# Patient Record
Sex: FEMALE | Race: WHITE | NOT HISPANIC OR LATINO | Employment: UNEMPLOYED | URBAN - METROPOLITAN AREA
[De-identification: names, ages, dates, MRNs, and addresses within clinical notes are randomized per-mention and may not be internally consistent; named-entity substitution may affect disease eponyms.]

---

## 2017-07-31 ENCOUNTER — TRANSCRIBE ORDERS (OUTPATIENT)
Dept: WOMENS IMAGING | Facility: HOSPITAL | Age: 22
End: 2017-07-31

## 2017-07-31 DIAGNOSIS — O28.3 ABNORMAL FETAL ULTRASOUND: Primary | ICD-10-CM

## 2017-08-08 ENCOUNTER — OFFICE VISIT (OUTPATIENT)
Dept: OBSTETRICS AND GYNECOLOGY | Facility: HOSPITAL | Age: 22
End: 2017-08-08

## 2017-08-08 ENCOUNTER — HOSPITAL ENCOUNTER (OUTPATIENT)
Dept: WOMENS IMAGING | Facility: HOSPITAL | Age: 22
Discharge: HOME | End: 2017-08-08
Admitting: NURSE PRACTITIONER

## 2017-08-08 VITALS
HEIGHT: 63 IN | DIASTOLIC BLOOD PRESSURE: 63 MMHG | BODY MASS INDEX: 33.84 KG/M2 | SYSTOLIC BLOOD PRESSURE: 120 MMHG | WEIGHT: 191 LBS

## 2017-08-08 DIAGNOSIS — IMO0001 FETAL RENAL ANOMALY, FETUS 1: Primary | ICD-10-CM

## 2017-08-08 DIAGNOSIS — O35.DXX0 ECHOGENIC FOCUS OF BOWEL OF FETUS AFFECTING ANTEPARTUM CARE OF MOTHER: ICD-10-CM

## 2017-08-08 DIAGNOSIS — O28.3 ABNORMAL FETAL ULTRASOUND: ICD-10-CM

## 2017-08-08 PROBLEM — IMO0002 FETAL RENAL ANOMALY: Status: ACTIVE | Noted: 2017-08-08

## 2017-08-08 PROCEDURE — 76811 OB US DETAILED SNGL FETUS: CPT | Performed by: OBSTETRICS & GYNECOLOGY

## 2017-08-08 PROCEDURE — 99241 PR OFFICE CONSULTATION NEW/ESTAB PATIENT 15 MIN: CPT | Performed by: OBSTETRICS & GYNECOLOGY

## 2017-08-08 PROCEDURE — 76811 OB US DETAILED SNGL FETUS: CPT

## 2017-08-08 RX ORDER — PRENATAL WITH FERROUS FUM AND FOLIC ACID 3080; 920; 120; 400; 22; 1.84; 3; 20; 10; 1; 12; 200; 27; 25; 2 [IU]/1; [IU]/1; MG/1; [IU]/1; MG/1; MG/1; MG/1; MG/1; MG/1; MG/1; UG/1; MG/1; MG/1; MG/1; MG/1
TABLET ORAL DAILY
COMMUNITY

## 2017-08-08 RX ORDER — BUPRENORPHINE 2 MG/1
1 TABLET SUBLINGUAL DAILY
COMMUNITY

## 2017-08-08 NOTE — PROGRESS NOTES
Documentation of the ultrasound findings, images, and interpretations will be available in the patient's ViewPoint report located in the chart review imaging tab in [facility].

## 2017-08-08 NOTE — PROGRESS NOTES
Pt denies problems with pregnancy. Pt denies having had any genetic screening tests. Next OB appt in 4 weeks.

## 2017-08-14 ENCOUNTER — TELEPHONE (OUTPATIENT)
Dept: WOMENS IMAGING | Facility: HOSPITAL | Age: 22
End: 2017-08-14

## 2017-09-05 ENCOUNTER — APPOINTMENT (OUTPATIENT)
Dept: WOMENS IMAGING | Facility: HOSPITAL | Age: 22
End: 2017-09-05
Attending: OBSTETRICS & GYNECOLOGY

## 2017-09-13 ENCOUNTER — OFFICE VISIT (OUTPATIENT)
Dept: OBSTETRICS AND GYNECOLOGY | Facility: HOSPITAL | Age: 22
End: 2017-09-13

## 2017-09-13 ENCOUNTER — HOSPITAL ENCOUNTER (OUTPATIENT)
Dept: WOMENS IMAGING | Facility: HOSPITAL | Age: 22
Discharge: HOME | End: 2017-09-13
Attending: OBSTETRICS & GYNECOLOGY | Admitting: NURSE PRACTITIONER

## 2017-09-13 VITALS — BODY MASS INDEX: 35.46 KG/M2 | DIASTOLIC BLOOD PRESSURE: 55 MMHG | SYSTOLIC BLOOD PRESSURE: 110 MMHG | WEIGHT: 197 LBS

## 2017-09-13 DIAGNOSIS — IMO0001 FETAL RENAL ANOMALY, FETUS 1: ICD-10-CM

## 2017-09-13 DIAGNOSIS — IMO0002 FETAL ANOMALY: Primary | ICD-10-CM

## 2017-09-13 DIAGNOSIS — O35.BXX0 ECHOGENIC FOCUS OF HEART OF FETUS AFFECTING ANTEPARTUM CARE OF MOTHER, NOT APPLICABLE OR UNSPECIFIED FETUS: ICD-10-CM

## 2017-09-13 PROCEDURE — 76816 OB US FOLLOW-UP PER FETUS: CPT | Performed by: OBSTETRICS & GYNECOLOGY

## 2017-09-13 PROCEDURE — 76816 OB US FOLLOW-UP PER FETUS: CPT

## 2017-11-01 ENCOUNTER — APPOINTMENT (OUTPATIENT)
Dept: WOMENS IMAGING | Facility: HOSPITAL | Age: 22
End: 2017-11-01
Attending: OBSTETRICS & GYNECOLOGY

## 2017-11-01 ENCOUNTER — OFFICE VISIT (OUTPATIENT)
Dept: OBSTETRICS AND GYNECOLOGY | Facility: HOSPITAL | Age: 22
End: 2017-11-01

## 2017-11-01 ENCOUNTER — HOSPITAL ENCOUNTER (OUTPATIENT)
Dept: WOMENS IMAGING | Facility: HOSPITAL | Age: 22
Discharge: HOME | End: 2017-11-01
Attending: OBSTETRICS & GYNECOLOGY | Admitting: NURSE PRACTITIONER

## 2017-11-01 VITALS — BODY MASS INDEX: 36.5 KG/M2 | DIASTOLIC BLOOD PRESSURE: 66 MMHG | WEIGHT: 202.8 LBS | SYSTOLIC BLOOD PRESSURE: 125 MMHG

## 2017-11-01 DIAGNOSIS — IMO0001 ANOMALY OF KIDNEY OF FETUS, SINGLE OR UNSPECIFIED FETUS: Primary | ICD-10-CM

## 2017-11-01 DIAGNOSIS — Z34.90 PREGNANCY, UNSPECIFIED GESTATIONAL AGE: ICD-10-CM

## 2017-11-01 DIAGNOSIS — IMO0002 FETAL ANOMALY: ICD-10-CM

## 2017-11-01 DIAGNOSIS — O35.BXX0 ECHOGENIC FOCUS OF HEART OF FETUS AFFECTING ANTEPARTUM CARE OF MOTHER, SINGLE OR UNSPECIFIED FETUS: ICD-10-CM

## 2017-11-01 DIAGNOSIS — O35.BXX0 ECHOGENIC FOCUS OF HEART OF FETUS AFFECTING ANTEPARTUM CARE OF MOTHER, NOT APPLICABLE OR UNSPECIFIED FETUS: ICD-10-CM

## 2017-11-01 PROCEDURE — 76816 OB US FOLLOW-UP PER FETUS: CPT | Performed by: OBSTETRICS & GYNECOLOGY

## 2017-11-01 PROCEDURE — 76816 OB US FOLLOW-UP PER FETUS: CPT

## 2017-11-01 NOTE — PROGRESS NOTES
Denies problems. Has decreased Subutex dose to 1 mg once daily. Transferred her care to Dr. Wilson in [place] 2-3 months ago; to see him tomorrow.